# Patient Record
Sex: MALE | Race: WHITE | URBAN - METROPOLITAN AREA
[De-identification: names, ages, dates, MRNs, and addresses within clinical notes are randomized per-mention and may not be internally consistent; named-entity substitution may affect disease eponyms.]

---

## 2017-08-14 DIAGNOSIS — I10 ESSENTIAL HYPERTENSION WITH GOAL BLOOD PRESSURE LESS THAN 140/90: ICD-10-CM

## 2017-08-14 NOTE — TELEPHONE ENCOUNTER
hydrochlorothiazide (HYDRODIURIL) 25 MG tablet      Last Written Prescription Date: 8/23/16  Last Fill Quantity: 90, # refills: 3  Last Office Visit with [facility], [facility] or [facility] prescribing provider: 8/23/16       Potassium   Date Value Ref Range Status   08/23/2016 4.4 3.4 - 5.3 mmol/L Final     Creatinine   Date Value Ref Range Status   08/23/2016 1.52 (H) 0.66 - 1.25 mg/dL Final     BP Readings from Last 3 Encounters:   08/23/16 136/60   09/17/15 136/60   08/24/15 138/62         amLODIPine (NORVASC) 10 MG tablet      Last Written Prescription Date: 8/23/16  Last Fill Quantity: 90, # refills: 3    Last Office Visit with [facility] or [facility] prescribing provider:  8/23/16   Future Office Visit:        BP Readings from Last 3 Encounters:   08/23/16 136/60   09/17/15 136/60   08/24/15 138/62

## 2017-08-15 RX ORDER — AMLODIPINE BESYLATE 10 MG/1
TABLET ORAL
Qty: 90 TABLET | Refills: 0 | Status: SHIPPED | OUTPATIENT
Start: 2017-08-15 | End: 2017-08-17

## 2017-08-15 RX ORDER — HYDROCHLOROTHIAZIDE 25 MG/1
TABLET ORAL
Qty: 90 TABLET | Refills: 0 | Status: SHIPPED | OUTPATIENT
Start: 2017-08-15 | End: 2017-08-17

## 2017-08-15 NOTE — TELEPHONE ENCOUNTER
Routing refill request to provider for review/approval because:  Labs out of range:  creatinine    One refill only of Amlodipine as patient due for annual office visit this month.    Mason-Please sign if agree.    Team coordinators-Please contact patient to schedule annual physical.    Thank you!  ALISHA GrantN, RN

## 2017-08-17 ENCOUNTER — OFFICE VISIT (OUTPATIENT)
Dept: FAMILY MEDICINE | Facility: CLINIC | Age: 82
End: 2017-08-17
Payer: MEDICARE

## 2017-08-17 VITALS
RESPIRATION RATE: 15 BRPM | OXYGEN SATURATION: 95 % | WEIGHT: 155 LBS | BODY MASS INDEX: 22.19 KG/M2 | HEIGHT: 70 IN | HEART RATE: 78 BPM | SYSTOLIC BLOOD PRESSURE: 136 MMHG | DIASTOLIC BLOOD PRESSURE: 68 MMHG | TEMPERATURE: 97.1 F

## 2017-08-17 DIAGNOSIS — I10 ESSENTIAL HYPERTENSION WITH GOAL BLOOD PRESSURE LESS THAN 140/90: Primary | ICD-10-CM

## 2017-08-17 DIAGNOSIS — E78.5 HYPERLIPIDEMIA LDL GOAL <100: ICD-10-CM

## 2017-08-17 DIAGNOSIS — E03.8 SUBCLINICAL HYPOTHYROIDISM: ICD-10-CM

## 2017-08-17 DIAGNOSIS — N18.30 CKD (CHRONIC KIDNEY DISEASE) STAGE 3, GFR 30-59 ML/MIN (H): ICD-10-CM

## 2017-08-17 LAB
ALBUMIN SERPL-MCNC: 4.1 G/DL (ref 3.4–5)
ALP SERPL-CCNC: 63 U/L (ref 40–150)
ALT SERPL W P-5'-P-CCNC: 20 U/L (ref 0–70)
ANION GAP SERPL CALCULATED.3IONS-SCNC: 10 MMOL/L (ref 3–14)
AST SERPL W P-5'-P-CCNC: 27 U/L (ref 0–45)
BASOPHILS # BLD AUTO: 0 10E9/L (ref 0–0.2)
BASOPHILS NFR BLD AUTO: 0.4 %
BILIRUB SERPL-MCNC: 1.1 MG/DL (ref 0.2–1.3)
BUN SERPL-MCNC: 19 MG/DL (ref 7–30)
CALCIUM SERPL-MCNC: 9.3 MG/DL (ref 8.5–10.1)
CHLORIDE SERPL-SCNC: 101 MMOL/L (ref 94–109)
CHOLEST SERPL-MCNC: 243 MG/DL
CO2 SERPL-SCNC: 26 MMOL/L (ref 20–32)
CREAT SERPL-MCNC: 1.46 MG/DL (ref 0.66–1.25)
CREAT UR-MCNC: 113 MG/DL
DIFFERENTIAL METHOD BLD: ABNORMAL
EOSINOPHIL # BLD AUTO: 0.8 10E9/L (ref 0–0.7)
EOSINOPHIL NFR BLD AUTO: 10 %
ERYTHROCYTE [DISTWIDTH] IN BLOOD BY AUTOMATED COUNT: 14.4 % (ref 10–15)
GFR SERPL CREATININE-BSD FRML MDRD: 46 ML/MIN/1.7M2
GLUCOSE SERPL-MCNC: 94 MG/DL (ref 70–99)
HCT VFR BLD AUTO: 40.5 % (ref 40–53)
HDLC SERPL-MCNC: 53 MG/DL
HGB BLD-MCNC: 14 G/DL (ref 13.3–17.7)
LDLC SERPL CALC-MCNC: 158 MG/DL
LYMPHOCYTES # BLD AUTO: 1.8 10E9/L (ref 0.8–5.3)
LYMPHOCYTES NFR BLD AUTO: 23.5 %
MCH RBC QN AUTO: 29.9 PG (ref 26.5–33)
MCHC RBC AUTO-ENTMCNC: 34.6 G/DL (ref 31.5–36.5)
MCV RBC AUTO: 86 FL (ref 78–100)
MICROALBUMIN UR-MCNC: 20 MG/L
MICROALBUMIN/CREAT UR: 17.26 MG/G CR (ref 0–17)
MONOCYTES # BLD AUTO: 0.6 10E9/L (ref 0–1.3)
MONOCYTES NFR BLD AUTO: 8.3 %
NEUTROPHILS # BLD AUTO: 4.4 10E9/L (ref 1.6–8.3)
NEUTROPHILS NFR BLD AUTO: 57.8 %
NONHDLC SERPL-MCNC: 190 MG/DL
PLATELET # BLD AUTO: 197 10E9/L (ref 150–450)
POTASSIUM SERPL-SCNC: 3.7 MMOL/L (ref 3.4–5.3)
PROT SERPL-MCNC: 8.1 G/DL (ref 6.8–8.8)
RBC # BLD AUTO: 4.69 10E12/L (ref 4.4–5.9)
SODIUM SERPL-SCNC: 137 MMOL/L (ref 133–144)
T4 FREE SERPL-MCNC: 0.99 NG/DL (ref 0.76–1.46)
TRIGL SERPL-MCNC: 160 MG/DL
TSH SERPL DL<=0.005 MIU/L-ACNC: 6.6 MU/L (ref 0.4–4)
WBC # BLD AUTO: 7.6 10E9/L (ref 4–11)

## 2017-08-17 PROCEDURE — 80050 GENERAL HEALTH PANEL: CPT | Performed by: PHYSICIAN ASSISTANT

## 2017-08-17 PROCEDURE — 99214 OFFICE O/P EST MOD 30 MIN: CPT | Performed by: PHYSICIAN ASSISTANT

## 2017-08-17 PROCEDURE — 80061 LIPID PANEL: CPT | Performed by: PHYSICIAN ASSISTANT

## 2017-08-17 PROCEDURE — 36415 COLL VENOUS BLD VENIPUNCTURE: CPT | Performed by: PHYSICIAN ASSISTANT

## 2017-08-17 PROCEDURE — 82043 UR ALBUMIN QUANTITATIVE: CPT | Performed by: PHYSICIAN ASSISTANT

## 2017-08-17 PROCEDURE — 84439 ASSAY OF FREE THYROXINE: CPT | Performed by: PHYSICIAN ASSISTANT

## 2017-08-17 RX ORDER — AMLODIPINE BESYLATE 10 MG/1
TABLET ORAL
Qty: 90 TABLET | Refills: 3 | Status: SHIPPED | OUTPATIENT
Start: 2017-08-17 | End: 2018-01-01

## 2017-08-17 RX ORDER — HYDROCHLOROTHIAZIDE 25 MG/1
TABLET ORAL
Qty: 90 TABLET | Refills: 3 | Status: SHIPPED | OUTPATIENT
Start: 2017-08-17 | End: 2018-01-01

## 2017-08-17 NOTE — LETTER
Suresh Ellis  [address] [place] [id number]        August 18, 2017          Dear ,    We are writing to inform you of your test results.    This letter is sent to inform you of recent test results and to provide you with personal records of health information.    Your attached labs are overall normal/stable, and we will continue to monitor them regularly.  Desired or goal levels are:  CHOLESTEROL: Desirable is less than 200.  HDL (Good Cholesterol): Desirable is greater than 40 in men and greater than 50 in women.  LDL (Bad Cholesterol): Desirable is less than 130 or less than 100 in patients with diabetes or vascular disease. For some patients with diabetes or vascular disease, the desireable LDL is less that 70.  TRIGLYCERIDES: Desirable is less than 150.    I recommend that you take Omega-3 fatty acids (available in capsules) to improve your overall levels, especially your triglycerides. You need 2000 - 4000 mg daily of EPA + DHA. This usually means 4 - 8 capsules a day, depending on the strength you buy or you can try taking red yeast rice (an herbal supplement that contains a natural statin) start with 600 mg once daily and increase to 1200 mg twice daily as tolerated.     As you may know, abnormal cholesterol is one factor that increases your risk for heart disease and stroke. You can improve your cholesterol by controlling the amount and type of fat you eat and by increasing your daily activity level.    Here are some ways to improve your nutrition (adapted from the American Academy of Family [employer] handout):  Eat less fat (especially butter, Crisco and other saturated fats)  Buy lean cuts of meat; reduce your portions of red meat or substitute poultry or fish  Use skim milk and low-fat dairy products  Eat no more than 4 egg yolks per week  Avoid fried or fast foods that are high in fat  Eat more fruits and vegetables    Also consider starting or increasing your aerobic  activity; this is the best way to improve HDL (good) cholesterol. If aerobic activity would be new to you, please talk with me first about what activities are safe for you.     Thank you for allowing me to participate in your care. If you have any further questions or problems, please contact me at [phone].    Resulted Orders   Comprehensive metabolic panel   Result Value Ref Range    Sodium 137 133 - 144 mmol/L    Potassium 3.7 3.4 - 5.3 mmol/L    Chloride 101 94 - 109 mmol/L    Carbon Dioxide 26 20 - 32 mmol/L    Anion Gap 10 3 - 14 mmol/L    Glucose 94 70 - 99 mg/dL      Comment:      Fasting specimen    Urea Nitrogen 19 7 - 30 mg/dL    Creatinine 1.46 (H) 0.66 - 1.25 mg/dL    GFR Estimate 46 (L) >60 mL/min/1.7m2      Comment:      Non  GFR Calc    GFR Estimate If Black 56 (L) >60 mL/min/1.7m2      Comment:       GFR Calc    Calcium 9.3 8.5 - 10.1 mg/dL    Bilirubin Total 1.1 0.2 - 1.3 mg/dL    Albumin 4.1 3.4 - 5.0 g/dL    Protein Total 8.1 6.8 - 8.8 g/dL    Alkaline Phosphatase 63 40 - 150 U/L    ALT 20 0 - 70 U/L    AST 27 0 - 45 U/L   CBC with platelets differential   Result Value Ref Range    WBC 7.6 4.0 - 11.0 10e9/L    RBC Count 4.69 4.4 - 5.9 10e12/L    Hemoglobin 14.0 13.3 - 17.7 g/dL    Hematocrit 40.5 40.0 - 53.0 %    MCV 86 78 - 100 fl    MCH 29.9 26.5 - 33.0 pg    MCHC 34.6 31.5 - 36.5 g/dL    RDW 14.4 10.0 - 15.0 %    Platelet Count 197 150 - 450 10e9/L    Diff Method Automated Method     % Neutrophils 57.8 %    % Lymphocytes 23.5 %    % Monocytes 8.3 %    % Eosinophils 10.0 %    % Basophils 0.4 %    Absolute Neutrophil 4.4 1.6 - 8.3 10e9/L    Absolute Lymphocytes 1.8 0.8 - 5.3 10e9/L    Absolute Monocytes 0.6 0.0 - 1.3 10e9/L    Absolute Eosinophils 0.8 (H) 0.0 - 0.7 10e9/L    Absolute Basophils 0.0 0.0 - 0.2 10e9/L   Albumin Random Urine Quantitative   Result Value Ref Range    Creatinine Urine 113 mg/dL    Albumin Urine mg/L 20 mg/L    Albumin Urine mg/g Cr  17.26 (H) 0 - 17 mg/g Cr   Lipid panel reflex to direct LDL   Result Value Ref Range    Cholesterol 243 (H) <200 mg/dL      Comment:      Desirable:       <200 mg/dl    Triglycerides 160 (H) <150 mg/dL      Comment:      Borderline high:  150-199 mg/dl  High:             200-499 mg/dl  Very high:       >499 mg/dl  Fasting specimen      HDL Cholesterol 53 >39 mg/dL    LDL Cholesterol Calculated 158 (H) <100 mg/dL      Comment:      Above desirable:  100-129 mg/dl  Borderline High:  130-159 mg/dL  High:             160-189 mg/dL  Very high:       >189 mg/dl      Non HDL Cholesterol 190 (H) <130 mg/dL      Comment:      Above Desirable:  130-159 mg/dl  Borderline high:  160-189 mg/dl  High:             190-219 mg/dl  Very high:       >219 mg/dl     TSH with free T4 reflex   Result Value Ref Range    TSH 6.60 (H) 0.40 - 4.00 mU/L   T4 free   Result Value Ref Range    T4 Free 0.99 0.76 - 1.46 ng/dL     If you have any questions or concerns, please call the clinic at the number listed above.     Sincerely,  Danita Bowen PA-C/nr

## 2017-08-17 NOTE — PATIENT INSTRUCTIONS
Labs updated today.  Continue healthy diet and exercise.  Refills sent to pharmacy.  Return to clinic with any worsening or changes in symptoms and follow up for routine care.

## 2017-08-17 NOTE — NURSING NOTE
"Chief Complaint   Patient presents with     Lipids     Hypertension       Initial /64 (BP Location: Left arm, Patient Position: Sitting, Cuff Size: Adult Regular)  Pulse 80  Temp 97.1  F (36.2  C) (Tympanic)  Resp 15  Ht 5' 10.25\" (1.784 m)  Wt 155 lb (70.3 kg)  SpO2 95%  BMI 22.08 kg/m2 Estimated body mass index is 22.08 kg/(m^2) as calculated from the following:    Height as of this encounter: 5' 10.25\" (1.784 m).    Weight as of this encounter: 155 lb (70.3 kg).  Medication Reconciliation: complete     Augie Alston CMA  "

## 2017-08-17 NOTE — PROGRESS NOTES
SUBJECTIVE:                                                    Suresh Ellis is a 80 year old male who presents to clinic today for the following health issues:      Hyperlipidemia Follow-Up      Rate your low fat/cholesterol diet?: fair    Taking statin?  No    Other lipid medications/supplements?:  Fish oil/Omega 3, without side effects     Hypertension Follow-up      Outpatient blood pressures are not being checked.    Low Salt Diet: not monitoring salt       Amount of exercise or physical activity: 1-2 days/week for an average of 15-30 minutes    Problems taking medications regularly: No    Medication side effects: none    Diet: regular (no restrictions)    Doing well overall with no specific complaints.    Taking medicine daily with no issues.    History of subclinical hypothyroid, but no issues/sxs, will repeat labs today.       Problem list and histories reviewed & adjusted, as indicated.  Additional history: as documented    Problem list, Medication list, Allergies, and Medical/Social/Surgical histories reviewed in EPIC and updated as appropriate.      History     Social History     Marital Status: Single     Spouse Name:      Number of Children: 3     Years of Education: 12     Occupational History      Retired     Social History Main Topics     Smoking status: Former Smoker     Quit date: 01/01/1994     Smokeless tobacco: Never Used     Alcohol Use: No      Comment: quit 1974     Drug Use: No     Sexual Activity: No     Other Topics Concern      Service Yes     [id number]     Blood Transfusions No     Caffeine Concern No     Occupational Exposure No     Hobby Hazards No     Sleep Concern No     Stress Concern No     Weight Concern No     Special Diet No     Back Care No     Exercise No     Bike Helmet No     Seat Belt Yes     Self-Exams No     Parent/Sibling W/ Cabg, Mi Or Angioplasty Before 65f 55m? No     Social History Narrative     Allergies   Allergen Reactions     Seasonal Allergies   "    Patient Active Problem List   Diagnosis     HYPERLIPIDEMIA LDL GOAL <100     CKD (chronic kidney disease) stage 3, GFR 30-59 ml/min     Subclinical hypothyroidism     Hypertension goal BP (blood pressure) < 140/90     Vitamin D deficiency disease     Advanced directives, counseling/discussion     Impaired fasting glucose     Reviewed medications, social history and  past medical and surgical history.    Review of system: for general, respiratory, CVS, GI and psychiatry negative except for noted above.     EXAM:  /64 (BP Location: Left arm, Patient Position: Sitting, Cuff Size: Adult Regular)  Pulse 80  Temp 97.1  F (36.2  C) (Tympanic)  Resp 15  Ht 5' 10.25\" (1.784 m)  Wt 155 lb (70.3 kg)  SpO2 95%  BMI 22.08 kg/m2  Constitutional: healthy, alert and no distress   Psychiatric: mentation appears normal and affect normal/bright  Cardiovascular: RRR. No murmurs,  Respiratory: negative, Lungs clear. No crackles or wheezing. No tachypnea.     ASSESSMENT / PLAN:  Hyperlipidemia; controlled   Plan:  No changes in the patient's current treatment plan  Labs:   Lipid    Hypertension; controlled   Associated with the following complications:    CKD   Plan:  No changes in the patient's current treatment plan; continue current medicine daily, refills sent to pharmacy today.  Labs:   CMP, Lipid, CBC and Microalbumin    Hypothyroidism; subclinical   Plan:  No changes in the patient's current treatment plan  Labs:  TSH    Patient not interested in pneumonia vaccine at this time.      ICD-10-CM    1. Essential hypertension with goal blood pressure less than 140/90 I10 Comprehensive metabolic panel     CBC with platelets differential     Albumin Random Urine Quantitative     hydrochlorothiazide (HYDRODIURIL) 25 MG tablet     amLODIPine (NORVASC) 10 MG tablet   2. Hyperlipidemia LDL goal <100 E78.5 Lipid panel reflex to direct LDL   3. CKD (chronic kidney disease) stage 3, GFR 30-59 ml/min N18.3 Comprehensive " metabolic panel     CBC with platelets differential     Albumin Random Urine Quantitative   4. Subclinical hypothyroidism E03.9 TSH with free T4 reflex     Patient Instructions   Labs updated today.  Continue healthy diet and exercise.  Refills sent to pharmacy.  Return to clinic with any worsening or changes in symptoms and follow up for routine care.        EDER John

## 2017-08-17 NOTE — NURSING NOTE
"Vitals:    08/17/17 0855 08/17/17 0909   BP: 142/64 136/68   BP Location: Left arm Left arm   Patient Position: Sitting Sitting   Cuff Size: Adult Regular Adult Regular   Pulse: 80 78   Resp: 15    Temp: 97.1  F (36.2  C)    TempSrc: Tympanic    SpO2: 95%    Weight: 155 lb (70.3 kg)    Height: 5' 10.25\" (1.784 m)          Augie Alston CMA  "

## 2017-08-18 NOTE — PROGRESS NOTES
Please send patient letter with the following:  This letter is sent to inform you of recent test results and to provide you with personal records of health information.    Your attached labs are overall normal/stable, and we will continue to monitor them regularly.  Desired or goal levels are:  CHOLESTEROL: Desirable is less than 200.  HDL (Good Cholesterol): Desirable is greater than 40 in men and greater than 50 in women.  LDL (Bad Cholesterol): Desirable is less than 130 or less than 100 in patients with diabetes or vascular disease. For some patients with diabetes or vascular disease, the desireable LDL is less that 70.  TRIGLYCERIDES: Desirable is less than 150.    I recommend that you take Omega-3 fatty acids (available in capsules) to improve your overall levels, especially your triglycerides. You need 2000 - 4000 mg daily of EPA + DHA. This usually means 4 - 8 capsules a day, depending on the strength you buy or you can try taking red yeast rice (an herbal supplement that contains a natural statin) start with 600 mg once daily and increase to 1200 mg twice daily as tolerated.     As you may know, abnormal cholesterol is one factor that increases your risk for heart disease and stroke. You can improve your cholesterol by controlling the amount and type of fat you eat and by increasing your daily activity level.    Here are some ways to improve your nutrition (adapted from the American Academy of Family Practice handout):  Eat less fat (especially butter, Crisco and other saturated fats)  Buy lean cuts of meat; reduce your portions of red meat or substitute poultry or fish  Use skim milk and low-fat dairy products  Eat no more than 4 egg yolks per week  Avoid fried or fast foods that are high in fat  Eat more fruits and vegetables    Also consider starting or increasing your aerobic activity; this is the best way to improve HDL (good) cholesterol. If aerobic activity would be new to you, please talk with me  "first about what activities are safe for you.     Thank you for allowing me to participate in your care. If you have any further questions or problems, please contact me at [phone].    Danita Rubio \"Mason\" EDER Bowen"

## 2017-11-07 ENCOUNTER — OFFICE VISIT (OUTPATIENT)
Dept: FAMILY MEDICINE | Facility: CLINIC | Age: 82
End: 2017-11-07
Payer: MEDICARE

## 2017-11-07 VITALS
BODY MASS INDEX: 22.58 KG/M2 | DIASTOLIC BLOOD PRESSURE: 60 MMHG | TEMPERATURE: 97.3 F | HEART RATE: 80 BPM | WEIGHT: 158.5 LBS | RESPIRATION RATE: 12 BRPM | OXYGEN SATURATION: 97 % | SYSTOLIC BLOOD PRESSURE: 150 MMHG

## 2017-11-07 DIAGNOSIS — E03.8 SUBCLINICAL HYPOTHYROIDISM: Primary | ICD-10-CM

## 2017-11-07 DIAGNOSIS — I10 HYPERTENSION GOAL BP (BLOOD PRESSURE) < 150/90: ICD-10-CM

## 2017-11-07 DIAGNOSIS — E78.5 HYPERLIPIDEMIA LDL GOAL <100: ICD-10-CM

## 2017-11-07 LAB
T4 FREE SERPL-MCNC: 1.01 NG/DL (ref 0.76–1.46)
TSH SERPL DL<=0.005 MIU/L-ACNC: 8.78 MU/L (ref 0.4–4)

## 2017-11-07 PROCEDURE — 99214 OFFICE O/P EST MOD 30 MIN: CPT | Performed by: PHYSICIAN ASSISTANT

## 2017-11-07 PROCEDURE — 84443 ASSAY THYROID STIM HORMONE: CPT | Performed by: PHYSICIAN ASSISTANT

## 2017-11-07 PROCEDURE — 84439 ASSAY OF FREE THYROXINE: CPT | Performed by: PHYSICIAN ASSISTANT

## 2017-11-07 PROCEDURE — 36415 COLL VENOUS BLD VENIPUNCTURE: CPT | Performed by: PHYSICIAN ASSISTANT

## 2017-11-07 NOTE — MR AVS SNAPSHOT
"              After Visit Summary   11/7/2017    Suresh Ellis    MRN: [id number]           Patient Information     Date Of Birth          1935        Visit Information        Provider Department      11/7/2017 8:00 AM Danita Bowen PA-C [facility]        Today's Diagnoses     Subclinical hypothyroidism    -  1    Hypertension goal BP (blood pressure) < 150/90        Hyperlipidemia LDL goal <100          Care Instructions    Thyroid labs updated today.  Refills on regular medicine to be sent to pharmacy as needed.  Return to clinic with any worsening or changes in symptoms or go to ER Urgent care in off hours.            Follow-ups after your visit        Follow-up notes from your care team     Return in about 6 months (around 5/7/2018), or if symptoms worsen or fail to improve.      Who to contact     If you have questions or need follow up information about today's clinic visit or your schedule please contact [facility] directly at [phone].  Normal or non-critical lab and imaging results will be communicated to you by MyChart, letter or phone within 4 business days after the clinic has received the results. If you do not hear from us within 7 days, please contact the clinic through Gentishart or phone. If you have a critical or abnormal lab result, we will notify you by phone as soon as possible.  Submit refill requests through Fetch It or call your pharmacy and they will forward the refill request to us. Please allow 3 business days for your refill to be completed.          Additional Information About Your Visit        Gentishart Information     Fetch It lets you send messages to your doctor, view your test results, renew your prescriptions, schedule appointments and more. To sign up, go to [email or web address] . Click on \"Log in\" on the left side of the screen, which will take you to the Welcome page. Then click on \"Sign up Now\" on the right side of the page. "     You will be asked to enter the access code listed below, as well as some personal information. Please follow the directions to create your username and password.     Your access code is: [email or web address]  Expires: 11/15/2017  8:07 AM     Your access code will  in 90 days. If you need help or a new code, please call your [place] clinic or [phone].        Care EveryWhere ID     This is your Care EveryWhere ID. This could be used by other organizations to access your [place] medical records  [id number]        Your Vitals Were     Pulse Temperature Respirations Pulse Oximetry BMI (Body Mass Index)       80 97.3  F (36.3  C) (Tympanic) 12 97% 22.58 kg/m2        Blood Pressure from Last 3 Encounters:   17 150/60   17 136/68   16 136/60    Weight from Last 3 Encounters:   17 158 lb 8 oz (71.9 kg)   17 155 lb (70.3 kg)   16 160 lb (72.6 kg)              We Performed the Following     TSH with free T4 reflex        Primary Care Provider Office Phone # Fax #    Danita Bowen PA-C [phone]       [facility] [place] [address]        Equal Access to Services     FAITH HSU : Hadii duarte ku hadasho Soomaali, waaxda luqadaha, qaybta kaalmada adeegyada, dustin lawosn haycindy ramírez . So [facility] [phone].    ATENCIÓN: Si habla español, tiene a barrientos disposición servicios gratuitos de asistencia lingüística. Llame al [phone].    We comply with applicable federal civil rights laws and [place] laws. We do not discriminate on the basis of race, color, national origin, age, disability, sex, sexual orientation, or gender identity.            Thank you!     Thank you for choosing [facility]  for your care. Our goal is always to provide you with excellent care. Hearing back from our patients is one way we can continue to improve our services. Please take a few minutes to complete the written survey that you may  receive in the mail after your visit with us. Thank you!             Your Updated Medication List - Protect others around you: Learn how to safely use, store and throw away your medicines at www.disposemymeds.org.          This list is accurate as of: 11/7/17  8:16 AM.  Always use your most recent med list.                   Brand Name Dispense Instructions for use Diagnosis    amLODIPine 10 MG tablet    NORVASC    90 tablet    TAKE 1 TABLET(10 MG) BY MOUTH DAILY    Essential hypertension with goal blood pressure less than 140/90       hydrochlorothiazide 25 MG tablet    HYDRODIURIL    90 tablet    TAKE 1 TABLET(25 MG) BY MOUTH DAILY    Essential hypertension with goal blood pressure less than 140/90       OMEGA-3 FISH OIL PO           vitamin D 2000 UNITS tablet     100 tablet    Take 2,000 Units by mouth daily    Vitamin D deficiency disease

## 2017-11-07 NOTE — PATIENT INSTRUCTIONS
Thyroid labs updated today.  Refills on regular medicine to be sent to pharmacy as needed.  Return to clinic with any worsening or changes in symptoms or go to ER Urgent care in off hours.

## 2017-11-07 NOTE — PROGRESS NOTES
SUBJECTIVE:                                                    Suresh Ellis is a 80 year old male who presents to clinic today for the following health issues:      Hyperlipidemia Follow-Up      Rate your low fat/cholesterol diet?: fair    Taking statin?  No    Other lipid medications/supplements?:  Fish oil/Omega 3, without side effects     Hypertension Follow-up      Outpatient blood pressures are not being checked.    Low Salt Diet: not monitoring salt    Patient taking amlodipine 10 and hctz 25 mg daily with no issues.       Amount of exercise or physical activity: 1-2 days/week for an average of 15-30 minutes    Problems taking medications regularly: No    Medication side effects: none    Diet: regular (no restrictions)    Doing well overall with no specific complaints.    Taking medicine daily with no issues.    History of borderline hypothyroid, but no issues/sxs, would like to repeat labs today, elevated most recently 8/2017.       Problem list and histories reviewed & adjusted, as indicated.  Additional history: as documented    Problem list, Medication list, Allergies, and Medical/Social/Surgical histories reviewed in EPIC and updated as appropriate.      History     Social History     Marital Status: Single     Spouse Name:      Number of Children: 3     Years of Education: 12     Occupational History      Retired     Social History Main Topics     Smoking status: Former Smoker     Quit date: 01/01/1994     Smokeless tobacco: Never Used     Alcohol Use: No      Comment: quit 1974     Drug Use: No     Sexual Activity: No     Other Topics Concern      Service Yes     [id number]     Blood Transfusions No     Caffeine Concern No     Occupational Exposure No     Hobby Hazards No     Sleep Concern No     Stress Concern No     Weight Concern No     Special Diet No     Back Care No     Exercise No     Bike Helmet No     Seat Belt Yes     Self-Exams No     Parent/Sibling W/ Cabg, Mi Or  Angioplasty Before 65f 55m? No     Social History Narrative     Allergies   Allergen Reactions     Seasonal Allergies      Patient Active Problem List   Diagnosis     HYPERLIPIDEMIA LDL GOAL <100     CKD (chronic kidney disease) stage 3, GFR 30-59 ml/min     Subclinical hypothyroidism     Hypertension goal BP (blood pressure) < 150/90     Vitamin D deficiency disease     Advanced directives, counseling/discussion     Impaired fasting glucose     Reviewed medications, social history and  past medical and surgical history.    Review of system: for general, respiratory, CVS, GI and psychiatry negative except for noted above.     EXAM:  /60 (BP Location: Left arm, Patient Position: Sitting, Cuff Size: Adult Regular)  Pulse 80  Temp 97.3  F (36.3  C) (Tympanic)  Resp 12  Wt 158 lb 8 oz (71.9 kg)  SpO2 97%  BMI 22.58 kg/m2  Constitutional: healthy, alert and no distress   Psychiatric: mentation appears normal and affect normal/bright  Cardiovascular: RRR. No murmurs,  Respiratory: negative, Lungs clear. No crackles or wheezing. No tachypnea.     ASSESSMENT / PLAN:  Hyperlipidemia; controlled   Plan:  No changes in the patient's current treatment plan  Labs:   Up to date 8/2017    Hypertension; controlled   Associated with the following complications:    CKD   Plan:  No changes in the patient's current treatment plan; continue current medicine daily, refills to be sent to pharmacy when needed.  Labs:   Up to date 8/2017    Hypothyroidism; subclinical   Plan:  No changes in the patient's current treatment plan  Labs:  TSH updated today    Patient not interested in pneumonia or influenza vaccine at this time.      ICD-10-CM    1. Subclinical hypothyroidism E03.9 TSH with free T4 reflex   2. Hypertension goal BP (blood pressure) < 150/90 I10    3. Hyperlipidemia LDL goal <100 E78.5      Patient Instructions   Thyroid labs updated today.  Refills on regular medicine to be sent to pharmacy as needed.  Return to  clinic with any worsening or changes in symptoms or go to ER Urgent care in off hours.         EDER John

## 2017-11-07 NOTE — NURSING NOTE
"Chief Complaint   Patient presents with     Hypertension     Lipids       Initial /70 (BP Location: Left arm, Patient Position: Sitting, Cuff Size: Adult Regular)  Pulse 87  Temp 97.3  F (36.3  C) (Tympanic)  Resp 12  Wt 158 lb 8 oz (71.9 kg)  SpO2 97%  BMI 22.58 kg/m2 Estimated body mass index is 22.58 kg/(m^2) as calculated from the following:    Height as of 8/17/17: 5' 10.25\" (1.784 m).    Weight as of this encounter: 158 lb 8 oz (71.9 kg).  Medication Reconciliation: complete     Augie Alston CMA  "

## 2017-11-07 NOTE — LETTER
November 9, 2017      Suresh Ellis  [address]  [facility] [address]        Dear ,    We are writing to inform you of your test results.    This letter is sent to inform you of recent test results and to provide you with personal records of health information.    Your thyroid levels are elevated, but stable. We will continue to monitor these regularly, but no changes in medicine at this time.    Thank you for allowing me to participate in your care. If you have any further questions or problems, please contact me at [phone].    Resulted Orders   TSH with free T4 reflex   Result Value Ref Range    TSH 8.78 (H) 0.40 - 4.00 mU/L   T4 free   Result Value Ref Range    T4 Free 1.01 0.76 - 1.46 ng/dL       If you have any questions or concerns, please call the clinic at the number listed above.       Sincerely,        Danita Bowen PA-C/nr

## 2017-11-07 NOTE — NURSING NOTE
Vitals:    11/07/17 0757 11/07/17 0812   BP: 170/70 150/60   BP Location: Left arm Left arm   Patient Position: Sitting Sitting   Cuff Size: Adult Regular Adult Regular   Pulse: 87 80   Resp: 12    Temp: 97.3  F (36.3  C)    TempSrc: Tympanic    SpO2: 97%    Weight: 158 lb 8 oz (71.9 kg)          Augie Alston CMA

## 2017-11-09 NOTE — PROGRESS NOTES
"Please send patient letter with the following:  This letter is sent to inform you of recent test results and to provide you with personal records of health information.    Your thyroid levels are elevated, but stable. We will continue to monitor these regularly, but no changes in medicine at this time.    Thank you for allowing me to participate in your care. If you have any further questions or problems, please contact me at [phone].    Danita Rubio \"Mason\" EDER Bowen"

## 2018-01-01 ENCOUNTER — OFFICE VISIT (OUTPATIENT)
Dept: FAMILY MEDICINE | Facility: CLINIC | Age: 83
End: 2018-01-01
Payer: MEDICARE

## 2018-01-01 VITALS
WEIGHT: 156 LBS | OXYGEN SATURATION: 99 % | TEMPERATURE: 97.4 F | DIASTOLIC BLOOD PRESSURE: 70 MMHG | RESPIRATION RATE: 14 BRPM | HEIGHT: 70 IN | SYSTOLIC BLOOD PRESSURE: 162 MMHG | HEART RATE: 88 BPM | BODY MASS INDEX: 22.33 KG/M2

## 2018-01-01 DIAGNOSIS — E78.5 HYPERLIPIDEMIA LDL GOAL <100: ICD-10-CM

## 2018-01-01 DIAGNOSIS — R73.01 IMPAIRED FASTING GLUCOSE: ICD-10-CM

## 2018-01-01 DIAGNOSIS — E03.8 SUBCLINICAL HYPOTHYROIDISM: ICD-10-CM

## 2018-01-01 DIAGNOSIS — N18.30 CKD (CHRONIC KIDNEY DISEASE) STAGE 3, GFR 30-59 ML/MIN (H): ICD-10-CM

## 2018-01-01 DIAGNOSIS — I10 ESSENTIAL HYPERTENSION WITH GOAL BLOOD PRESSURE LESS THAN 140/90: Primary | ICD-10-CM

## 2018-01-01 LAB
ALBUMIN SERPL-MCNC: 4.1 G/DL (ref 3.4–5)
ALP SERPL-CCNC: 68 U/L (ref 40–150)
ALT SERPL W P-5'-P-CCNC: 19 U/L (ref 0–70)
ANION GAP SERPL CALCULATED.3IONS-SCNC: 9 MMOL/L (ref 3–14)
AST SERPL W P-5'-P-CCNC: 28 U/L (ref 0–45)
BASOPHILS # BLD AUTO: 0 10E9/L (ref 0–0.2)
BASOPHILS NFR BLD AUTO: 0.3 %
BILIRUB SERPL-MCNC: 0.8 MG/DL (ref 0.2–1.3)
BUN SERPL-MCNC: 22 MG/DL (ref 7–30)
CALCIUM SERPL-MCNC: 9.7 MG/DL (ref 8.5–10.1)
CHLORIDE SERPL-SCNC: 99 MMOL/L (ref 94–109)
CHOLEST SERPL-MCNC: 237 MG/DL
CO2 SERPL-SCNC: 26 MMOL/L (ref 20–32)
CREAT SERPL-MCNC: 1.49 MG/DL (ref 0.66–1.25)
CREAT UR-MCNC: 61 MG/DL
DIFFERENTIAL METHOD BLD: ABNORMAL
EOSINOPHIL # BLD AUTO: 0.9 10E9/L (ref 0–0.7)
EOSINOPHIL NFR BLD AUTO: 8.9 %
ERYTHROCYTE [DISTWIDTH] IN BLOOD BY AUTOMATED COUNT: 14.9 % (ref 10–15)
GFR SERPL CREATININE-BSD FRML MDRD: 45 ML/MIN/1.7M2
GLUCOSE SERPL-MCNC: 110 MG/DL (ref 70–99)
HCT VFR BLD AUTO: 40.5 % (ref 40–53)
HDLC SERPL-MCNC: 53 MG/DL
HGB BLD-MCNC: 13.9 G/DL (ref 13.3–17.7)
LDLC SERPL CALC-MCNC: 161 MG/DL
LYMPHOCYTES # BLD AUTO: 1.7 10E9/L (ref 0.8–5.3)
LYMPHOCYTES NFR BLD AUTO: 16.4 %
MCH RBC QN AUTO: 28.9 PG (ref 26.5–33)
MCHC RBC AUTO-ENTMCNC: 34.3 G/DL (ref 31.5–36.5)
MCV RBC AUTO: 84 FL (ref 78–100)
MICROALBUMIN UR-MCNC: 25 MG/L
MICROALBUMIN/CREAT UR: 41.5 MG/G CR (ref 0–17)
MONOCYTES # BLD AUTO: 0.9 10E9/L (ref 0–1.3)
MONOCYTES NFR BLD AUTO: 8.4 %
NEUTROPHILS # BLD AUTO: 6.7 10E9/L (ref 1.6–8.3)
NEUTROPHILS NFR BLD AUTO: 66 %
NONHDLC SERPL-MCNC: 184 MG/DL
PLATELET # BLD AUTO: 225 10E9/L (ref 150–450)
POTASSIUM SERPL-SCNC: 4.2 MMOL/L (ref 3.4–5.3)
PROT SERPL-MCNC: 8.5 G/DL (ref 6.8–8.8)
RBC # BLD AUTO: 4.81 10E12/L (ref 4.4–5.9)
SODIUM SERPL-SCNC: 134 MMOL/L (ref 133–144)
T4 FREE SERPL-MCNC: 0.93 NG/DL (ref 0.76–1.46)
TRIGL SERPL-MCNC: 113 MG/DL
TSH SERPL DL<=0.005 MIU/L-ACNC: 9.15 MU/L (ref 0.4–4)
WBC # BLD AUTO: 10.2 10E9/L (ref 4–11)

## 2018-01-01 PROCEDURE — 84443 ASSAY THYROID STIM HORMONE: CPT | Performed by: PHYSICIAN ASSISTANT

## 2018-01-01 PROCEDURE — 84439 ASSAY OF FREE THYROXINE: CPT | Performed by: PHYSICIAN ASSISTANT

## 2018-01-01 PROCEDURE — 36415 COLL VENOUS BLD VENIPUNCTURE: CPT | Performed by: PHYSICIAN ASSISTANT

## 2018-01-01 PROCEDURE — 80061 LIPID PANEL: CPT | Performed by: PHYSICIAN ASSISTANT

## 2018-01-01 PROCEDURE — 80053 COMPREHEN METABOLIC PANEL: CPT | Performed by: PHYSICIAN ASSISTANT

## 2018-01-01 PROCEDURE — 99214 OFFICE O/P EST MOD 30 MIN: CPT | Performed by: PHYSICIAN ASSISTANT

## 2018-01-01 PROCEDURE — 85025 COMPLETE CBC W/AUTO DIFF WBC: CPT | Performed by: PHYSICIAN ASSISTANT

## 2018-01-01 PROCEDURE — 82043 UR ALBUMIN QUANTITATIVE: CPT | Performed by: PHYSICIAN ASSISTANT

## 2018-01-01 RX ORDER — CHOLECALCIFEROL (VITAMIN D3) 50 MCG
2000 TABLET ORAL DAILY
Qty: 100 TABLET | Refills: 3 | Status: CANCELLED | OUTPATIENT
Start: 2018-01-01

## 2018-01-01 RX ORDER — HYDROCHLOROTHIAZIDE 25 MG/1
TABLET ORAL
Qty: 90 TABLET | Refills: 3 | Status: SHIPPED | OUTPATIENT
Start: 2018-01-01

## 2018-01-01 RX ORDER — AMLODIPINE BESYLATE 10 MG/1
TABLET ORAL
Qty: 90 TABLET | Refills: 3 | Status: SHIPPED | OUTPATIENT
Start: 2018-01-01

## 2018-06-05 ENCOUNTER — TELEPHONE (OUTPATIENT)
Dept: FAMILY MEDICINE | Facility: CLINIC | Age: 83
End: 2018-06-05

## 2018-10-29 NOTE — MR AVS SNAPSHOT
After Visit Summary   10/29/2018    Suresh Ellis    MRN: [id number]           Patient Information     Date Of Birth          1935        Visit Information        Provider Department      10/29/2018 10:40 AM Danita Bowen PA-C [facility]        Today's Diagnoses     Essential hypertension with goal blood pressure less than 140/90    -  1    Subclinical hypothyroidism        Hyperlipidemia LDL goal <100        CKD (chronic kidney disease) stage 3, GFR 30-59 ml/min (H)        Impaired fasting glucose          Care Instructions    Labs updated today.  Refills on regular medicine to be sent to pharmacy as needed.  Return to clinic with any worsening or changes in symptoms or go to ER Urgent care in off hours.          Follow-ups after your visit        Follow-up notes from your care team     Return in about 6 months (around 4/29/2019) for worsening symptoms.      Who to contact     If you have questions or need follow up information about today's clinic visit or your schedule please contact [facility] directly at [phone].  Normal or non-critical lab and imaging results will be communicated to you by MyChart, letter or phone within 4 business days after the clinic has received the results. If you do not hear from us within 7 days, please contact the clinic through MyChart or phone. If you have a critical or abnormal lab result, we will notify you by phone as soon as possible.  Submit refill requests through [employer] or call your pharmacy and they will forward the refill request to us. Please allow 3 business days for your refill to be completed.          Additional Information About Your Visit        Care EveryWhere ID     This is your Care EveryWhere ID. This could be used by other organizations to access your [facility] medical records  [id number]        Your Vitals Were     Pulse Temperature Respirations Height Pulse Oximetry BMI (Body Mass Index)    88  "97.4  F (36.3  C) (Oral) 14 5' 10\" (1.778 m) 99% 22.38 kg/m2       Blood Pressure from Last 3 Encounters:   10/29/18 162/70   11/07/17 150/60   08/17/17 136/68    Weight from Last 3 Encounters:   10/29/18 156 lb (70.8 kg)   11/07/17 158 lb 8 oz (71.9 kg)   08/17/17 155 lb (70.3 kg)              We Performed the Following     Albumin Random Urine Quantitative with Creat Ratio     CBC with platelets differential     Comprehensive metabolic panel     Lipid panel reflex to direct LDL Fasting     TSH with free T4 reflex          Where to get your medicines      These medications were sent to [facility] [address] [place] [address] AT [facility] & [address] [place] [id number]     Phone:  [phone]     amLODIPine 10 MG tablet    hydrochlorothiazide 25 MG tablet          Primary Care Provider Office Phone # Fax #    Danita Bowen PA-C [phone] [address] [facility] [address]        Equal Access to Services     FAITH HSU AH: Hadii aad ku hadasho Soomaali, waaxda luqadaha, qaybta kaalmada adeegyada, dustin lawson haybienvenidon silvia weiner. So [facility] [phone].    ATENCIÓN: Si habla español, tiene a barrientos disposición servicios gratuitos de asistencia lingüística. Yohannes al [phone].    We comply with applicable federal civil rights laws and [place] laws. We do not discriminate on the basis of race, color, national origin, age, disability, sex, sexual orientation, or gender identity.            Thank you!     Thank you for choosing [facility]  for your care. Our goal is always to provide you with excellent care. Hearing back from our patients is one way we can continue to improve our services. Please take a few minutes to complete the written survey that you may receive in the mail after your visit with us. Thank you!             Your Updated Medication List - Protect others around you: Learn how " to safely use, store and throw away your medicines at www.disposemymeds.org.          This list is accurate as of 10/29/18 10:56 AM.  Always use your most recent med list.                   Brand Name Dispense Instructions for use Diagnosis    amLODIPine 10 MG tablet    NORVASC    90 tablet    TAKE 1 TABLET(10 MG) BY MOUTH DAILY    Essential hypertension with goal blood pressure less than 140/90       hydrochlorothiazide 25 MG tablet    HYDRODIURIL    90 tablet    TAKE 1 TABLET(25 MG) BY MOUTH DAILY    Essential hypertension with goal blood pressure less than 140/90       OMEGA-3 FISH OIL PO           vitamin D 2000 units tablet     100 tablet    Take 2,000 Units by mouth daily    Vitamin D deficiency disease

## 2018-10-29 NOTE — PROGRESS NOTES
SUBJECTIVE:                                                    Suresh Ellis is a 80 year old male who presents to clinic today for the following health issues:      Hyperlipidemia Follow-Up      Rate your low fat/cholesterol diet?: fair    Taking statin?  No    Other lipid medications/supplements?:  Fish oil/Omega 3, without side effects     Hypertension Follow-up      Outpatient blood pressures are not being checked.    Low Salt Diet: trying to cut down on salt lately     Patient taking amlodipine 10 and hctz 25 mg daily with no issues.       Amount of exercise or physical activity: None     Problems taking medications regularly: No    Medication side effects: none    Diet: regular (no restrictions)    Doing well overall with no specific complaints.    Taking medicine daily with no issues.    History of borderline hypothyroid, but no issues/sxs, would like to repeat labs today, elevated most recently 8/2017.       Problem list and histories reviewed & adjusted, as indicated.  Additional history: as documented    Problem list, Medication list, Allergies, and Medical/Social/Surgical histories reviewed in EPIC and updated as appropriate.      History     Social History     Marital Status: Single     Spouse Name:      Number of Children: 3     Years of Education: 12     Occupational History      Retired     Social History Main Topics     Smoking status: Former Smoker     Quit date: 01/01/1994     Smokeless tobacco: Never Used     Alcohol Use: No      Comment: quit 1974     Drug Use: No     Sexual Activity: No     Other Topics Concern      Service Yes     [id number]     Blood Transfusions No     Caffeine Concern No     Occupational Exposure No     Hobby Hazards No     Sleep Concern No     Stress Concern No     Weight Concern No     Special Diet No     Back Care No     Exercise No     Bike Helmet No     Seat Belt Yes     Self-Exams No     Parent/Sibling W/ Cabg, Mi Or Angioplasty Before 65f 55m? No  "    Social History Narrative     Allergies   Allergen Reactions     Seasonal Allergies      Patient Active Problem List   Diagnosis     HYPERLIPIDEMIA LDL GOAL <100     CKD (chronic kidney disease) stage 3, GFR 30-59 ml/min (H)     Subclinical hypothyroidism     Hypertension goal BP (blood pressure) < 150/90     Vitamin D deficiency disease     Advanced directives, counseling/discussion     Impaired fasting glucose     Reviewed medications, social history and  past medical and surgical history.    Review of system: for general, respiratory, CVS, GI and psychiatry negative except for noted above.     EXAM:  /70 (BP Location: Left arm, Patient Position: Sitting, Cuff Size: Adult Regular)  Pulse 88  Temp 97.4  F (36.3  C) (Oral)  Resp 14  Ht 5' 10\" (1.778 m)  Wt 156 lb (70.8 kg)  SpO2 99%  BMI 22.38 kg/m2  Constitutional: healthy, alert and no distress   Psychiatric: mentation appears normal and affect normal/bright  Cardiovascular: RRR. No murmurs,  Respiratory: negative, Lungs clear. No crackles or wheezing. No tachypnea.     ASSESSMENT / PLAN:  Hyperlipidemia; controlled   Plan:  No changes in the patient's current treatment plan  Labs to be updated today.    Hypertension; controlled   Associated with the following complications:    CKD   Plan:  No changes in the patient's current treatment plan; continue current medicine daily, refills to be sent to pharmacy when needed.  Labs to be updated today.    Hypothyroidism; subclinical   Plan:  No changes in the patient's current treatment plan  Labs:  TSH updated today    Patient not interested in pneumonia or influenza vaccine at this time.      ICD-10-CM    1. Essential hypertension with goal blood pressure less than 140/90 I10 Comprehensive metabolic panel     Albumin Random Urine Quantitative with Creat Ratio     amLODIPine (NORVASC) 10 MG tablet     hydrochlorothiazide (HYDRODIURIL) 25 MG tablet   2. Subclinical hypothyroidism E03.9 TSH with free T4 " reflex   3. Hyperlipidemia LDL goal <100 E78.5 Lipid panel reflex to direct LDL Fasting   4. CKD (chronic kidney disease) stage 3, GFR 30-59 ml/min (H) N18.3 CBC with platelets differential   5. Impaired fasting glucose R73.01 Comprehensive metabolic panel     Patient Instructions   Labs updated today.  Refills on regular medicine to be sent to pharmacy as needed.  Return to clinic with any worsening or changes in symptoms or go to ER Urgent care in off hours.       [facility]

## 2018-10-29 NOTE — PATIENT INSTRUCTIONS
Labs updated today.  Refills on regular medicine to be sent to pharmacy as needed.  Return to clinic with any worsening or changes in symptoms or go to ER Urgent care in off hours.

## 2018-10-29 NOTE — LETTER
October 30, 2018      Suresh Ellis  [address]  [facility] [address]        Dear ,    We are writing to inform you of your test results.    This letter is sent to inform you of recent test results and to provide you with personal records of health information.    Your attached labs are overall normal/stable.  Your thyroid levels are still elevated, but if you are still not ready for medicine for this, we will continue to monitor these regularly.  Desired or goal levels are:  CHOLESTEROL: Desirable is less than 200.  HDL (Good Cholesterol): Desirable is greater than 40 in men and greater than 50 in women.  LDL (Bad Cholesterol): Desirable is less than 130 or less than 100 in patients with diabetes or vascular disease. For some patients with diabetes or vascular disease, the desireable LDL is less that 70.  TRIGLYCERIDES: Desirable is less than 150.    I recommend that you take Omega-3 fatty acids (available in capsules) to improve your overall levels, especially your triglycerides. You need 2000 - 4000 mg daily of EPA + DHA. This usually means 4 - 8 capsules a day, depending on the strength you buy or you can try taking red yeast rice (an herbal supplement that contains a natural statin) start with 600 mg once daily and increase to 1200 mg twice daily as tolerated.     As you may know, abnormal cholesterol is one factor that increases your risk for heart disease and stroke. You can improve your cholesterol by controlling the amount and type of fat you eat and by increasing your daily activity level.    Here are some ways to improve your nutrition (adapted from the American Academy of Family Practice handout):  Eat less fat (especially butter, Crisco and other saturated fats)  Buy lean cuts of meat; reduce your portions of red meat or substitute poultry or fish  Use skim milk and low-fat dairy products  Eat no more than 4 egg yolks per week  Avoid fried or fast foods that are high in fat  Eat more  fruits and vegetables    Also consider starting or increasing your aerobic activity; this is the best way to improve HDL (good) cholesterol. If aerobic activity would be new to you, please talk with me first about what activities are safe for you.   Thank you for allowing me to participate in your care. If you have any further questions or problems, please contact me at [phone].    Resulted Orders   Comprehensive metabolic panel   Result Value Ref Range    Sodium 134 133 - 144 mmol/L    Potassium 4.2 3.4 - 5.3 mmol/L    Chloride 99 94 - 109 mmol/L    Carbon Dioxide 26 20 - 32 mmol/L    Anion Gap 9 3 - 14 mmol/L    Glucose 110 (H) 70 - 99 mg/dL      Comment:      Fasting specimen    Urea Nitrogen 22 7 - 30 mg/dL    Creatinine 1.49 (H) 0.66 - 1.25 mg/dL    GFR Estimate 45 (L) >60 mL/min/1.7m2      Comment:      Non  GFR Calc    GFR Estimate If Black 55 (L) >60 mL/min/1.7m2      Comment:       GFR Calc    Calcium 9.7 8.5 - 10.1 mg/dL    Bilirubin Total 0.8 0.2 - 1.3 mg/dL    Albumin 4.1 3.4 - 5.0 g/dL    Protein Total 8.5 6.8 - 8.8 g/dL    Alkaline Phosphatase 68 40 - 150 U/L    ALT 19 0 - 70 U/L    AST 28 0 - 45 U/L   Albumin Random Urine Quantitative with Creat Ratio   Result Value Ref Range    Creatinine Urine 61 mg/dL    Albumin Urine mg/L 25 mg/L    Albumin Urine mg/g Cr 41.50 (H) 0 - 17 mg/g Cr   CBC with platelets differential   Result Value Ref Range    WBC 10.2 4.0 - 11.0 10e9/L    RBC Count 4.81 4.4 - 5.9 10e12/L    Hemoglobin 13.9 13.3 - 17.7 g/dL    Hematocrit 40.5 40.0 - 53.0 %    MCV 84 78 - 100 fl    MCH 28.9 26.5 - 33.0 pg    MCHC 34.3 31.5 - 36.5 g/dL    RDW 14.9 10.0 - 15.0 %    Platelet Count 225 150 - 450 10e9/L    % Neutrophils 66.0 %    % Lymphocytes 16.4 %    % Monocytes 8.4 %    % Eosinophils 8.9 %    % Basophils 0.3 %    Absolute Neutrophil 6.7 1.6 - 8.3 10e9/L    Absolute Lymphocytes 1.7 0.8 - 5.3 10e9/L    Absolute Monocytes 0.9 0.0 - 1.3 10e9/L     Absolute Eosinophils 0.9 (H) 0.0 - 0.7 10e9/L    Absolute Basophils 0.0 0.0 - 0.2 10e9/L    Diff Method Automated Method    Lipid panel reflex to direct LDL Fasting   Result Value Ref Range    Cholesterol 237 (H) <200 mg/dL      Comment:      Desirable:       <200 mg/dl    Triglycerides 113 <150 mg/dL      Comment:      Fasting specimen    HDL Cholesterol 53 >39 mg/dL    LDL Cholesterol Calculated 161 (H) <100 mg/dL      Comment:      Above desirable:  100-129 mg/dl  Borderline High:  130-159 mg/dL  High:             160-189 mg/dL  Very high:       >189 mg/dl      Non HDL Cholesterol 184 (H) <130 mg/dL      Comment:      Above Desirable:  130-159 mg/dl  Borderline high:  160-189 mg/dl  High:             190-219 mg/dl  Very high:       >219 mg/dl     TSH with free T4 reflex   Result Value Ref Range    TSH 9.15 (H) 0.40 - 4.00 mU/L   T4 free   Result Value Ref Range    T4 Free 0.93 0.76 - 1.46 ng/dL       If you have any questions or concerns, please call the clinic at the number listed above.       Sincerely,        Danita Bowen PA-C/nr

## 2018-10-30 NOTE — PROGRESS NOTES
Please send patient letter with the following:  This letter is sent to inform you of recent test results and to provide you with personal records of health information.    Your attached labs are overall normal/stable.  Your thyroid levels are still elevated, but if you are still not ready for medicine for this, we will continue to monitor these regularly.  Desired or goal levels are:  CHOLESTEROL: Desirable is less than 200.  HDL (Good Cholesterol): Desirable is greater than 40 in men and greater than 50 in women.  LDL (Bad Cholesterol): Desirable is less than 130 or less than 100 in patients with diabetes or vascular disease. For some patients with diabetes or vascular disease, the desireable LDL is less that 70.  TRIGLYCERIDES: Desirable is less than 150.    I recommend that you take Omega-3 fatty acids (available in capsules) to improve your overall levels, especially your triglycerides. You need 2000 - 4000 mg daily of EPA + DHA. This usually means 4 - 8 capsules a day, depending on the strength you buy or you can try taking red yeast rice (an herbal supplement that contains a natural statin) start with 600 mg once daily and increase to 1200 mg twice daily as tolerated.     As you may know, abnormal cholesterol is one factor that increases your risk for heart disease and stroke. You can improve your cholesterol by controlling the amount and type of fat you eat and by increasing your daily activity level.    Here are some ways to improve your nutrition (adapted from the American Academy of Family Practice handout):  Eat less fat (especially butter, Crisco and other saturated fats)  Buy lean cuts of meat; reduce your portions of red meat or substitute poultry or fish  Use skim milk and low-fat dairy products  Eat no more than 4 egg yolks per week  Avoid fried or fast foods that are high in fat  Eat more fruits and vegetables    Also consider starting or increasing your aerobic activity; this is the best way to  "improve HDL (good) cholesterol. If aerobic activity would be new to you, please talk with me first about what activities are safe for you.   Thank you for allowing me to participate in your care. If you have any further questions or problems, please contact me at [phone].    Danita Rubio \"Mason\" EDER Bowen"

## 2019-07-09 ENCOUNTER — TELEPHONE (OUTPATIENT)
Dept: FAMILY MEDICINE | Facility: CLINIC | Age: 84
End: 2019-07-09

## 2019-07-09 NOTE — TELEPHONE ENCOUNTER
Writer called Medical Examiner's Office, spoke with Ayah, and answered her questions to the best of writer's ability with chart review.    Ayah stated Medical Examiner will sign death certificate.    ALISHA WeinbergN, RN

## 2019-07-09 NOTE — TELEPHONE ENCOUNTER
Reason for Call:  Other FYI/UPDATE    Detailed comments: Pt  in his back yard yesterday. Looks like by natural causes. The [facility] Examiner's office are requesting a signed death certificate, medical history, recent med list, and the past 3-4 month's worth of office visits be discussed via phone for documentation.     Phone Number - [phone]        Call taken on 2019 at 9:53 AM by Carli Cortés